# Patient Record
Sex: FEMALE | URBAN - METROPOLITAN AREA
[De-identification: names, ages, dates, MRNs, and addresses within clinical notes are randomized per-mention and may not be internally consistent; named-entity substitution may affect disease eponyms.]

---

## 2024-03-25 ENCOUNTER — APPOINTMENT (RX ONLY)
Dept: URBAN - METROPOLITAN AREA CLINIC 125 | Facility: CLINIC | Age: 29
Setting detail: DERMATOLOGY
End: 2024-03-25

## 2024-03-25 DIAGNOSIS — L50.1 IDIOPATHIC URTICARIA: ICD-10-CM | Status: INADEQUATELY CONTROLLED

## 2024-03-25 PROCEDURE — ? TREATMENT REGIMEN

## 2024-03-25 PROCEDURE — 99203 OFFICE O/P NEW LOW 30 MIN: CPT

## 2024-03-25 PROCEDURE — ? CHRONOLOGY OF PRESENT ILLNESS

## 2024-03-25 PROCEDURE — ? COUNSELING

## 2024-03-25 ASSESSMENT — LOCATION ZONE DERM: LOCATION ZONE: TRUNK

## 2024-03-25 ASSESSMENT — LOCATION SIMPLE DESCRIPTION DERM: LOCATION SIMPLE: LEFT UPPER BACK

## 2024-03-25 ASSESSMENT — LOCATION DETAILED DESCRIPTION DERM: LOCATION DETAILED: LEFT SUPERIOR MEDIAL UPPER BACK

## 2024-03-25 NOTE — HPI: HIVES (URTICARIA)
How Severe Are Your Hives?: mild
Please Select The Phrase That Best Describes Your Hives.: individual welts do not stay in the same place for more than 24 hours
Is This A New Presentation, Or A Follow-Up?: Hives
Additional History: Patient reports a rash that usually occurs on the summer from the chlorine. She states when the rash occurs it’s painful and itchy, she would like further recommendations

## 2024-03-25 NOTE — PROCEDURE: TREATMENT REGIMEN
Detail Level: Zone
Initiate Treatment: :\\n-Xyzal: take 1 tab PO QAM\\n-Zyrtec: take 1 tab PO QHS
Plan: :\\n-Recommended a functional nutritionist R/B/SE. Handout given for information of functional nutritionist.

## 2024-03-25 NOTE — PROCEDURE: CHRONOLOGY OF PRESENT ILLNESS
Chronology Of Present Illness: 03/25/24: Patient has been having prolonged symptoms including fatigue, loss of appetite, weight loss, and occasional rashes. She is currently under the care of a rheumatologist and was diagnosed with Sjogren's but is also still getting worked up for it. She is also seeing a neurologist, gastroenterologist, and cardiologist. Recommend seeing a functional nutritionist to patient. Patient with rash that flares with heat. Recommend treatment with antihistamines (Xyzal in the AM and Zyrtec in the PM) at this time. Will continue to monitor.
Detail Level: Zone